# Patient Record
Sex: MALE | Race: WHITE | Employment: FULL TIME | URBAN - METROPOLITAN AREA
[De-identification: names, ages, dates, MRNs, and addresses within clinical notes are randomized per-mention and may not be internally consistent; named-entity substitution may affect disease eponyms.]

---

## 2017-03-10 ENCOUNTER — OFFICE VISIT (OUTPATIENT)
Dept: INTERNAL MEDICINE CLINIC | Facility: CLINIC | Age: 42
End: 2017-03-10

## 2017-03-10 VITALS
TEMPERATURE: 98 F | HEART RATE: 77 BPM | HEIGHT: 70 IN | OXYGEN SATURATION: 99 % | DIASTOLIC BLOOD PRESSURE: 76 MMHG | SYSTOLIC BLOOD PRESSURE: 114 MMHG | WEIGHT: 160.5 LBS | BODY MASS INDEX: 22.98 KG/M2

## 2017-03-10 DIAGNOSIS — L40.8 OTHER PSORIASIS: ICD-10-CM

## 2017-03-10 DIAGNOSIS — N52.9 ERECTILE DYSFUNCTION, UNSPECIFIED ERECTILE DYSFUNCTION TYPE: Primary | ICD-10-CM

## 2017-03-10 DIAGNOSIS — Z00.00 PREVENTATIVE HEALTH CARE: ICD-10-CM

## 2017-03-10 PROCEDURE — 99213 OFFICE O/P EST LOW 20 MIN: CPT | Performed by: INTERNAL MEDICINE

## 2017-03-10 RX ORDER — SILDENAFIL 25 MG/1
25 TABLET, FILM COATED ORAL
Qty: 8 TABLET | Refills: 5 | Status: SHIPPED | OUTPATIENT
Start: 2017-03-10 | End: 2017-03-24 | Stop reason: DRUGHIGH

## 2017-03-10 NOTE — PROGRESS NOTES
Fredrich Gowers is a 39year old male. HPI:   Patient presents with:  New Patient  Other: Personal concern. Patient is here to establish care with our clinic; he was seen at another [facility] clinic last year.   He presents with complaint of erectile dy lb    EXAM:   /76 mmHg  Pulse 77  Temp(Src) 97.7 °F (36.5 °C) (Oral)  Ht 70\"  Wt 160 lb 8 oz  BMI 23.03 kg/m2  SpO2 99%  GENERAL: Alert and oriented, well developed, well nourished,in no apparent distress  HEENT: atraumatic, PERRLA, EOMI, normal lid

## 2017-03-10 NOTE — PATIENT INSTRUCTIONS
We will check your comprehensive blood tests. Come back when you are fasting for the blood work. I have sent in a prescription for Viagra. It was a pleasure seeing you in the clinic today.   Thank you for choosing the [facility]

## 2017-03-13 ENCOUNTER — LAB ENCOUNTER (OUTPATIENT)
Dept: LAB | Age: 42
End: 2017-03-13
Attending: INTERNAL MEDICINE
Payer: COMMERCIAL

## 2017-03-13 ENCOUNTER — TELEPHONE (OUTPATIENT)
Dept: INTERNAL MEDICINE CLINIC | Facility: CLINIC | Age: 42
End: 2017-03-13

## 2017-03-13 DIAGNOSIS — Z00.00 ENCOUNTER FOR PREVENTIVE HEALTH EXAMINATION: ICD-10-CM

## 2017-03-13 DIAGNOSIS — N52.9 ERECTILE DYSFUNCTION, UNSPECIFIED ERECTILE DYSFUNCTION TYPE: ICD-10-CM

## 2017-03-13 DIAGNOSIS — Z00.00 PREVENTATIVE HEALTH CARE: ICD-10-CM

## 2017-03-13 DIAGNOSIS — Z71.1 CONCERN ABOUT STD IN MALE WITHOUT DIAGNOSIS: ICD-10-CM

## 2017-03-13 DIAGNOSIS — Z00.00 ENCOUNTER FOR PREVENTIVE HEALTH EXAMINATION: Primary | ICD-10-CM

## 2017-03-13 LAB
25-HYDROXYVITAMIN D (TOTAL): 38.1 NG/ML (ref 30–100)
ALBUMIN SERPL-MCNC: 3.9 G/DL (ref 3.5–4.8)
ALP LIVER SERPL-CCNC: 55 U/L (ref 45–117)
ALT SERPL-CCNC: 20 U/L (ref 17–63)
AST SERPL-CCNC: 15 U/L (ref 15–41)
BASOPHILS # BLD AUTO: 0.04 X10(3) UL (ref 0–0.1)
BASOPHILS NFR BLD AUTO: 0.7 %
BILIRUB SERPL-MCNC: 0.6 MG/DL (ref 0.1–2)
BUN BLD-MCNC: 20 MG/DL (ref 8–20)
CALCIUM BLD-MCNC: 9.2 MG/DL (ref 8.3–10.3)
CHLORIDE: 110 MMOL/L (ref 101–111)
CHOLEST SMN-MCNC: 160 MG/DL (ref ?–200)
CO2: 31 MMOL/L (ref 22–32)
CREAT BLD-MCNC: 1.25 MG/DL (ref 0.7–1.3)
EOSINOPHIL # BLD AUTO: 0.29 X10(3) UL (ref 0–0.3)
EOSINOPHIL NFR BLD AUTO: 4.8 %
ERYTHROCYTE [DISTWIDTH] IN BLOOD BY AUTOMATED COUNT: 12.5 % (ref 11.5–16)
EST. AVERAGE GLUCOSE BLD GHB EST-MCNC: 105 MG/DL (ref 68–126)
GLUCOSE BLD-MCNC: 95 MG/DL (ref 70–99)
HBA1C MFR BLD HPLC: 5.3 % (ref ?–5.7)
HCT VFR BLD AUTO: 46.5 % (ref 37–53)
HDLC SERPL-MCNC: 55 MG/DL (ref 45–?)
HDLC SERPL: 2.91 {RATIO} (ref ?–4.97)
HGB BLD-MCNC: 16 G/DL (ref 13–17)
IMMATURE GRANULOCYTE COUNT: 0.01 X10(3) UL (ref 0–1)
IMMATURE GRANULOCYTE RATIO %: 0.2 %
LDLC SERPL CALC-MCNC: 93 MG/DL (ref ?–130)
LYMPHOCYTES # BLD AUTO: 1.97 X10(3) UL (ref 0.9–4)
LYMPHOCYTES NFR BLD AUTO: 32.8 %
M PROTEIN MFR SERPL ELPH: 7.8 G/DL (ref 6.1–8.3)
MCH RBC QN AUTO: 30 PG (ref 27–33.2)
MCHC RBC AUTO-ENTMCNC: 34.4 G/DL (ref 31–37)
MCV RBC AUTO: 87.2 FL (ref 80–99)
MONOCYTES # BLD AUTO: 0.62 X10(3) UL (ref 0.1–0.6)
MONOCYTES NFR BLD AUTO: 10.3 %
NEUTROPHIL ABS PRELIM: 3.07 X10 (3) UL (ref 1.3–6.7)
NEUTROPHILS # BLD AUTO: 3.07 X10(3) UL (ref 1.3–6.7)
NEUTROPHILS NFR BLD AUTO: 51.2 %
NONHDLC SERPL-MCNC: 105 MG/DL (ref ?–130)
PLATELET # BLD AUTO: 229 10(3)UL (ref 150–450)
POTASSIUM SERPL-SCNC: 4.7 MMOL/L (ref 3.6–5.1)
RBC # BLD AUTO: 5.33 X10(6)UL (ref 4.3–5.7)
RED CELL DISTRIBUTION WIDTH-SD: 39.7 FL (ref 35.1–46.3)
SODIUM SERPL-SCNC: 144 MMOL/L (ref 136–144)
T PALLIDUM AB SER QL IA: NONREACTIVE
TRIGLYCERIDES: 61 MG/DL (ref ?–150)
TSI SER-ACNC: 1.8 MIU/ML (ref 0.35–5.5)
VLDL: 12 MG/DL (ref 5–40)
WBC # BLD AUTO: 6 X10(3) UL (ref 4–13)

## 2017-03-13 PROCEDURE — 36415 COLL VENOUS BLD VENIPUNCTURE: CPT

## 2017-03-13 PROCEDURE — 80061 LIPID PANEL: CPT

## 2017-03-13 PROCEDURE — 84443 ASSAY THYROID STIM HORMONE: CPT

## 2017-03-13 PROCEDURE — 85025 COMPLETE CBC W/AUTO DIFF WBC: CPT

## 2017-03-13 PROCEDURE — 86780 TREPONEMA PALLIDUM: CPT

## 2017-03-13 PROCEDURE — 80053 COMPREHEN METABOLIC PANEL: CPT

## 2017-03-13 PROCEDURE — 82306 VITAMIN D 25 HYDROXY: CPT

## 2017-03-13 PROCEDURE — 83036 HEMOGLOBIN GLYCOSYLATED A1C: CPT

## 2017-03-13 PROCEDURE — 87389 HIV-1 AG W/HIV-1&-2 AB AG IA: CPT

## 2017-03-13 NOTE — TELEPHONE ENCOUNTER
I can add HIV and syphilis to the blood work he just did, but he will have to come back again to provide a urine sample for gonorrhea and chlamydia tests.

## 2017-03-13 NOTE — TELEPHONE ENCOUNTER
Patient called and asked for STD order for labs be added by doctor for him; he plans to do bloodwork this morning

## 2017-03-24 ENCOUNTER — OFFICE VISIT (OUTPATIENT)
Dept: INTERNAL MEDICINE CLINIC | Facility: CLINIC | Age: 42
End: 2017-03-24

## 2017-03-24 ENCOUNTER — TELEPHONE (OUTPATIENT)
Dept: INTERNAL MEDICINE CLINIC | Facility: CLINIC | Age: 42
End: 2017-03-24

## 2017-03-24 VITALS
TEMPERATURE: 98 F | BODY MASS INDEX: 23.19 KG/M2 | DIASTOLIC BLOOD PRESSURE: 80 MMHG | OXYGEN SATURATION: 98 % | RESPIRATION RATE: 21 BRPM | SYSTOLIC BLOOD PRESSURE: 108 MMHG | HEART RATE: 83 BPM | WEIGHT: 162 LBS | HEIGHT: 70 IN

## 2017-03-24 DIAGNOSIS — N52.9 ERECTILE DYSFUNCTION, UNSPECIFIED ERECTILE DYSFUNCTION TYPE: Primary | ICD-10-CM

## 2017-03-24 PROCEDURE — 99213 OFFICE O/P EST LOW 20 MIN: CPT | Performed by: INTERNAL MEDICINE

## 2017-03-24 RX ORDER — SILDENAFIL CITRATE 20 MG/1
20 TABLET ORAL
Qty: 30 TABLET | Refills: 3 | Status: SHIPPED | OUTPATIENT
Start: 2017-03-24 | End: 2017-03-24

## 2017-03-24 RX ORDER — SILDENAFIL 100 MG/1
100 TABLET, FILM COATED ORAL AS NEEDED
Qty: 8 TABLET | Refills: 11 | Status: SHIPPED | OUTPATIENT
Start: 2017-03-24 | End: 2017-03-24

## 2017-03-24 RX ORDER — SILDENAFIL CITRATE 20 MG/1
20 TABLET ORAL
Qty: 30 TABLET | Refills: 3 | COMMUNITY
Start: 2017-03-24 | End: 2017-04-26 | Stop reason: ALTCHOICE

## 2017-03-24 RX ORDER — TADALAFIL 20 MG/1
20 TABLET ORAL
Qty: 5 TABLET | Refills: 5 | Status: SHIPPED | OUTPATIENT
Start: 2017-03-24 | End: 2017-03-24

## 2017-03-24 NOTE — TELEPHONE ENCOUNTER
We can try Cialis, but I expect that will be similar cost.  Will send in a Cialis prescription to [facility].  Otherwise would need to see if Urology has any other recommendations.

## 2017-03-24 NOTE — TELEPHONE ENCOUNTER
That's fine. Will send in prescription. Can cancel previous prescription for Viagra 100 mg and Cialis 20 mg.

## 2017-03-24 NOTE — PROGRESS NOTES
Chidi Sadler is a 39year old male. HPI:   Patient presents with:  Erectile Dysfuntion: f/u  Patient presents to discuss erectile dysfunction. He was given samples for 25 mg Viagra, took 12.5 mg first, then 25 mg, with no help with symptoms.    St Resp 21  Ht 70\"  Wt 162 lb  BMI 23.24 kg/m2  SpO2 98%  GENERAL: Alert and oriented, well developed, well nourished,in no apparent distress  HEENT: atraumatic, PERRLA, EOMI, normal lid and conjunctiva  LUNGS: clear to auscultation bilaterally, no wheezing/

## 2017-03-24 NOTE — TELEPHONE ENCOUNTER
Pharmacist called back stating that pt was actually looking to have RX changed to generic viagra 20 mg tablets which is covered at a lower cost to pt. Pt was confused when they contacted our office.      RP, ok to change to sildenafil citrate 20 mg?

## 2017-03-24 NOTE — PATIENT INSTRUCTIONS
For your ED:  - Continue with Viagra as needed  - Follow up with Urology (Janneth Osei)  - We will also check your testosterone levels today. It was a pleasure seeing you in the clinic today.   Thank you for choosing the [facility]

## 2017-03-24 NOTE — TELEPHONE ENCOUNTER
Pt's wife called to inform Viagra will be &60 for 3 tabs only, per insurance coverage. Inquiring if any other alterative for medication. Please advise.

## 2017-03-27 ENCOUNTER — APPOINTMENT (OUTPATIENT)
Dept: LAB | Age: 42
End: 2017-03-27
Attending: INTERNAL MEDICINE
Payer: COMMERCIAL

## 2017-03-27 DIAGNOSIS — N52.9 ERECTILE DYSFUNCTION, UNSPECIFIED ERECTILE DYSFUNCTION TYPE: ICD-10-CM

## 2017-03-27 LAB
FSH: 2.9 MIU/ML (ref 1.4–18.1)
LH: 3.1 MIU/ML (ref 1.5–9.3)

## 2017-03-27 PROCEDURE — 84403 ASSAY OF TOTAL TESTOSTERONE: CPT

## 2017-03-27 PROCEDURE — 83002 ASSAY OF GONADOTROPIN (LH): CPT

## 2017-03-27 PROCEDURE — 83001 ASSAY OF GONADOTROPIN (FSH): CPT

## 2017-03-27 PROCEDURE — 84402 ASSAY OF FREE TESTOSTERONE: CPT

## 2017-03-27 PROCEDURE — 36415 COLL VENOUS BLD VENIPUNCTURE: CPT

## 2017-04-26 ENCOUNTER — OFFICE VISIT (OUTPATIENT)
Dept: INTERNAL MEDICINE CLINIC | Facility: CLINIC | Age: 42
End: 2017-04-26

## 2017-04-26 VITALS
TEMPERATURE: 98 F | BODY MASS INDEX: 23 KG/M2 | DIASTOLIC BLOOD PRESSURE: 70 MMHG | RESPIRATION RATE: 15 BRPM | HEART RATE: 61 BPM | SYSTOLIC BLOOD PRESSURE: 106 MMHG | OXYGEN SATURATION: 99 % | WEIGHT: 163 LBS

## 2017-04-26 DIAGNOSIS — G89.29 CHRONIC BILATERAL LOW BACK PAIN WITHOUT SCIATICA: Primary | ICD-10-CM

## 2017-04-26 DIAGNOSIS — M54.50 CHRONIC BILATERAL LOW BACK PAIN WITHOUT SCIATICA: Primary | ICD-10-CM

## 2017-04-26 DIAGNOSIS — L40.8 OTHER PSORIASIS: ICD-10-CM

## 2017-04-26 PROCEDURE — 99214 OFFICE O/P EST MOD 30 MIN: CPT | Performed by: INTERNAL MEDICINE

## 2017-04-26 RX ORDER — CLOBETASOL PROPIONATE 0.5 MG/G
1 OINTMENT TOPICAL 2 TIMES DAILY
Qty: 30 G | Refills: 1 | Status: SHIPPED | OUTPATIENT
Start: 2017-04-26 | End: 2018-07-13

## 2017-04-26 RX ORDER — CLOBETASOL PROPIONATE 0.5 MG/G
1 OINTMENT TOPICAL 2 TIMES DAILY
Qty: 30 G | Refills: 1 | COMMUNITY
Start: 2017-04-26 | End: 2017-04-26

## 2017-04-26 NOTE — PATIENT INSTRUCTIONS
- Continue with home exercises  - Take diclofenac (anti-inflammatory) every 8 hours as needed for pain  - Follow up with Dr. Flory Maradiaga (Chiropractor). It was a pleasure seeing you in the clinic today.   Thank you for choosing the [facility]

## 2017-04-26 NOTE — PROGRESS NOTES
Nida Oscra is a 39year old male. HPI:   Patient presents with:  Low Back Pain: +2 years, no injury, both sides of lower back, refill on cream   Patient presents for follow up on chronic lower back pain. This has been going on for two years.   Ac reports that he does not drink alcohol or use illicit drugs.     Wt Readings from Last 6 Encounters:  04/26/17 : 163 lb  03/24/17 : 162 lb  03/10/17 : 160 lb 8 oz  03/29/16 : 152 lb  07/31/15 : 159 lb  12/30/14 : 163 lb    EXAM:   /70 mmHg  Pulse 61

## 2017-10-11 ENCOUNTER — IMMUNIZATION (OUTPATIENT)
Dept: INTERNAL MEDICINE CLINIC | Facility: CLINIC | Age: 42
End: 2017-10-11

## 2017-10-11 DIAGNOSIS — Z23 NEED FOR VACCINATION: ICD-10-CM

## 2017-10-11 PROCEDURE — 90686 IIV4 VACC NO PRSV 0.5 ML IM: CPT | Performed by: PHYSICIAN ASSISTANT

## 2017-10-11 PROCEDURE — 90471 IMMUNIZATION ADMIN: CPT | Performed by: PHYSICIAN ASSISTANT

## 2017-11-09 ENCOUNTER — HOSPITAL ENCOUNTER (OUTPATIENT)
Dept: GENERAL RADIOLOGY | Age: 42
Discharge: HOME | End: 2017-11-09
Attending: INTERNAL MEDICINE
Payer: COMMERCIAL

## 2017-11-09 ENCOUNTER — OFFICE VISIT (OUTPATIENT)
Dept: INTERNAL MEDICINE CLINIC | Facility: CLINIC | Age: 42
End: 2017-11-09

## 2017-11-09 VITALS
HEART RATE: 78 BPM | OXYGEN SATURATION: 98 % | WEIGHT: 160 LBS | DIASTOLIC BLOOD PRESSURE: 80 MMHG | SYSTOLIC BLOOD PRESSURE: 122 MMHG | BODY MASS INDEX: 22.9 KG/M2 | HEIGHT: 70 IN | TEMPERATURE: 99 F | RESPIRATION RATE: 13 BRPM

## 2017-11-09 DIAGNOSIS — G89.29 CHRONIC RIGHT-SIDED LOW BACK PAIN WITH RIGHT-SIDED SCIATICA: Primary | ICD-10-CM

## 2017-11-09 DIAGNOSIS — M54.41 CHRONIC RIGHT-SIDED LOW BACK PAIN WITH RIGHT-SIDED SCIATICA: Primary | ICD-10-CM

## 2017-11-09 DIAGNOSIS — M54.41 CHRONIC RIGHT-SIDED LOW BACK PAIN WITH RIGHT-SIDED SCIATICA: ICD-10-CM

## 2017-11-09 DIAGNOSIS — G89.29 CHRONIC RIGHT-SIDED LOW BACK PAIN WITH RIGHT-SIDED SCIATICA: ICD-10-CM

## 2017-11-09 PROCEDURE — 99214 OFFICE O/P EST MOD 30 MIN: CPT | Performed by: INTERNAL MEDICINE

## 2017-11-09 PROCEDURE — 72110 X-RAY EXAM L-2 SPINE 4/>VWS: CPT | Performed by: INTERNAL MEDICINE

## 2017-11-09 NOTE — PATIENT INSTRUCTIONS
- Continue to use diclofenac as needed for back pain  - We will check an x-ray today.   If it is abnormal, we will check an MRI of the spine.  - Follow up with Dr. Tania Vincent (chiropractor) if MRI is not significantly abnormal.    It was a pleasure seeing you i

## 2017-11-09 NOTE — PROGRESS NOTES
Terrie Hough is a 43year old male. HPI:   Patient presents with:  Back Pain  Patient presents to discuss his lower back pain. This is a chronic issue, going on for 4+ years. Constant background pain, moderate severity, aching in quality.   More Patient Position: Sitting, Cuff Size: adult)   Pulse 78   Temp 98.8 °F (37.1 °C) (Oral)   Resp 13   Ht 70\"   Wt 160 lb   SpO2 98%   BMI 22.96 kg/m²   GENERAL: Alert and oriented, well developed, well nourished,in no apparent distress  HEENT: atraumatic, P

## 2017-11-11 PROBLEM — G89.29 CHRONIC RIGHT-SIDED LOW BACK PAIN WITH RIGHT-SIDED SCIATICA: Status: ACTIVE | Noted: 2017-11-11

## 2017-11-11 PROBLEM — M54.41 CHRONIC RIGHT-SIDED LOW BACK PAIN WITH RIGHT-SIDED SCIATICA: Status: ACTIVE | Noted: 2017-11-11

## 2017-11-13 ENCOUNTER — TELEPHONE (OUTPATIENT)
Dept: INTERNAL MEDICINE CLINIC | Facility: CLINIC | Age: 42
End: 2017-11-13

## 2017-11-27 ENCOUNTER — HOSPITAL ENCOUNTER (OUTPATIENT)
Dept: MRI IMAGING | Facility: HOSPITAL | Age: 42
Discharge: HOME | End: 2017-11-27
Attending: INTERNAL MEDICINE
Payer: COMMERCIAL

## 2017-11-27 DIAGNOSIS — M54.41 CHRONIC RIGHT-SIDED LOW BACK PAIN WITH RIGHT-SIDED SCIATICA: ICD-10-CM

## 2017-11-27 DIAGNOSIS — G89.29 CHRONIC RIGHT-SIDED LOW BACK PAIN WITH RIGHT-SIDED SCIATICA: ICD-10-CM

## 2017-11-27 PROCEDURE — 72148 MRI LUMBAR SPINE W/O DYE: CPT | Performed by: INTERNAL MEDICINE

## 2017-12-06 ENCOUNTER — OFFICE VISIT (OUTPATIENT)
Dept: SURGERY | Facility: CLINIC | Age: 42
End: 2017-12-06

## 2017-12-06 VITALS
HEIGHT: 70 IN | BODY MASS INDEX: 22.9 KG/M2 | DIASTOLIC BLOOD PRESSURE: 70 MMHG | SYSTOLIC BLOOD PRESSURE: 110 MMHG | WEIGHT: 160 LBS | HEART RATE: 88 BPM

## 2017-12-06 DIAGNOSIS — M54.41 CHRONIC RIGHT-SIDED LOW BACK PAIN WITH RIGHT-SIDED SCIATICA: Primary | ICD-10-CM

## 2017-12-06 DIAGNOSIS — G89.29 CHRONIC RIGHT-SIDED LOW BACK PAIN WITH RIGHT-SIDED SCIATICA: Primary | ICD-10-CM

## 2017-12-06 PROCEDURE — 99204 OFFICE O/P NEW MOD 45 MIN: CPT | Performed by: PHYSICIAN ASSISTANT

## 2017-12-06 NOTE — PATIENT INSTRUCTIONS
Refill policies:    • Allow 2-3 business days for refills; controlled substances may take longer.   • Contact your pharmacy at least 5 days prior to running out of medication and have them send an electronic request or submit request through the [facility] have a procedure or additional testing performed. [facility]) will contact your insurance carrier to obtain pre-certification or prior authorization.     Unfortunately, DAR has seen an increase in denial of payment even though the p

## 2017-12-06 NOTE — H&P
Neurosurgery Clinic Visit  2017    Mai Amezcua PCP:  Julissa Chiang MD    1975 MRN [id number]       CC: Back Pain    HPI:    Karla Bundy is a very pleasant 43year old male who presents with chronic right sided back pain.   Has been ongoing for in HPI.      Physical Exam:     12/06/17  1449   BP: 110/70   Pulse: 88       General: No Apparent Distress, Well Nourished, Well Developed, Normal Voice, Mood Appropriate, Appears Stated Age  HEENT: No scleral icterus, Good Dentition, No Facial Asymmetry = 45 Minutes; More than 50% spent coordinating care and counseling.     Traci Dias PA-C  [facility]  12/6/2017  3:45 PM

## 2017-12-06 NOTE — PROGRESS NOTES
Location of Pain: lower back worse on the right side pain radiates down into right hip    Date Pain Began: 3-4 yrs worsening in the past 6 months           Work Related:   No        Receiving Work Comp/Disability:   No    Numeric Rating Scale:  Pain at Pre

## 2017-12-08 ENCOUNTER — OFFICE VISIT (OUTPATIENT)
Dept: SURGERY | Facility: CLINIC | Age: 42
End: 2017-12-08

## 2017-12-08 VITALS
HEIGHT: 70 IN | BODY MASS INDEX: 22.9 KG/M2 | HEART RATE: 78 BPM | DIASTOLIC BLOOD PRESSURE: 82 MMHG | WEIGHT: 160 LBS | SYSTOLIC BLOOD PRESSURE: 120 MMHG

## 2017-12-08 DIAGNOSIS — M54.41 CHRONIC RIGHT-SIDED LOW BACK PAIN WITH RIGHT-SIDED SCIATICA: Primary | ICD-10-CM

## 2017-12-08 DIAGNOSIS — M79.18 MYOFASCIAL PAIN ON RIGHT SIDE: Primary | ICD-10-CM

## 2017-12-08 DIAGNOSIS — G89.29 CHRONIC RIGHT-SIDED LOW BACK PAIN WITH RIGHT-SIDED SCIATICA: Primary | ICD-10-CM

## 2017-12-08 PROCEDURE — 99204 OFFICE O/P NEW MOD 45 MIN: CPT | Performed by: ANESTHESIOLOGY

## 2017-12-08 NOTE — PROGRESS NOTES
HPI:    Patient ID: Irasema Brady is a 43year old male. HPI    Review of Systems         Current Outpatient Prescriptions:  DICLOFENAC SODIUM 50 MG Oral Tab EC TAKE 1 TABLET (50 MG TOTAL) BY MOUTH 3 (THREE) TIMES DAILY.  Disp: 45 tablet Rfl: 1   Clob

## 2017-12-08 NOTE — PATIENT INSTRUCTIONS
Refill policies:    • Allow 2-3 business days for refills; controlled substances may take longer.   • Contact your pharmacy at least 5 days prior to running out of medication and have them send an electronic request or submit request through the [facility] have a procedure or additional testing performed. [facility]) will contact your insurance carrier to obtain pre-certification or prior authorization.     Unfortunately, DAR has seen an increase in denial of payment even though the p

## 2017-12-08 NOTE — PROGRESS NOTES
Pain management referral initially entered for Dr. Daniele Vines. He is not in network. Changed referral order to Dr. Jame Garner.

## 2017-12-08 NOTE — H&P
Name: Sal Lee   : 1975   DOS: 2017     Chief complaint: Low back pain    History of present illness:  Sal Lee is a 43year old male complaining of 3 year history of intermittent right low back pain without any radicular sympto patient ambulates with normal gait. HEENT: No gross lesion noted. PEERL. No icterus. Neck and Upper Extremity: Supple. No thyromegaly or lymphadenopathy.     Motor Examination:    (R)   (L)  Deltoid:      5    5  Biceps:       5    5  Triceps:      5    5

## 2018-07-16 RX ORDER — CLOBETASOL PROPIONATE 0.5 MG/G
1 OINTMENT TOPICAL 2 TIMES DAILY
Qty: 30 G | Refills: 1 | Status: SHIPPED | OUTPATIENT
Start: 2018-07-16 | End: 2019-03-19

## 2018-07-16 NOTE — TELEPHONE ENCOUNTER
Refill requested: Clobetasol . 05 Ointment    Failed protocol      Last refill: 4/26/17 30G 1R  Relevant Labs: NA  Last OV / RTC advised: 11/9/17 Dr María Elena Wall   4/26/17:   2. Psoriasis  Chronic. Continue clobetasol cream as needed. Refill ordered.      Appt

## 2018-09-05 ENCOUNTER — TELEPHONE (OUTPATIENT)
Dept: INTERNAL MEDICINE CLINIC | Facility: CLINIC | Age: 43
End: 2018-09-05

## 2018-09-05 NOTE — TELEPHONE ENCOUNTER
Pt Spouse, Abhijit Wiggins, calling in on behalf of Raghu Lozano. Requesting to receive vaccinations for Whooping Cough and Tetanus (for becoming a new parent). Okay to schedule appointment?

## 2018-09-06 ENCOUNTER — NURSE ONLY (OUTPATIENT)
Dept: INTERNAL MEDICINE CLINIC | Facility: CLINIC | Age: 43
End: 2018-09-06
Payer: COMMERCIAL

## 2018-09-06 PROCEDURE — 90715 TDAP VACCINE 7 YRS/> IM: CPT | Performed by: INTERNAL MEDICINE

## 2018-09-06 PROCEDURE — 90471 IMMUNIZATION ADMIN: CPT | Performed by: INTERNAL MEDICINE

## 2019-03-19 RX ORDER — CLOBETASOL PROPIONATE 0.5 MG/G
1 OINTMENT TOPICAL 2 TIMES DAILY
Qty: 30 G | Refills: 1 | Status: SHIPPED | OUTPATIENT
Start: 2019-03-19 | End: 2019-06-26

## 2019-03-19 NOTE — TELEPHONE ENCOUNTER
Clobetasol Propionate 0.05 % External Ointment   Patient calling for refill he has moved please send to updated pharmacy in [facility] - updated in chart

## 2019-06-17 RX ORDER — CLOBETASOL PROPIONATE 0.5 MG/G
1 OINTMENT TOPICAL 2 TIMES DAILY
Qty: 30 G | Refills: 1 | OUTPATIENT
Start: 2019-06-17

## 2019-06-17 NOTE — TELEPHONE ENCOUNTER
Patient is requesting a refill on his prescription for Clobetasol Propionate 0.05 % External Ointment. Needs to be sent to [place] [address] [address] [place] [address], [place]  AT [address], [phone], [phone]. Please advise.

## 2019-06-17 NOTE — TELEPHONE ENCOUNTER
Clobetasol 0.05% filled 3-19-19 30 g with 1 refill     LOV 11-9-17   return to clinic as needed with myself.   No upcoming apt on file   Labs 3-27-17

## 2019-06-26 ENCOUNTER — OFFICE VISIT (OUTPATIENT)
Dept: INTERNAL MEDICINE CLINIC | Facility: CLINIC | Age: 44
End: 2019-06-26
Payer: COMMERCIAL

## 2019-06-26 VITALS
SYSTOLIC BLOOD PRESSURE: 104 MMHG | HEIGHT: 69.25 IN | TEMPERATURE: 99 F | HEART RATE: 80 BPM | DIASTOLIC BLOOD PRESSURE: 70 MMHG | BODY MASS INDEX: 23.14 KG/M2 | WEIGHT: 158 LBS | RESPIRATION RATE: 12 BRPM

## 2019-06-26 DIAGNOSIS — M54.41 CHRONIC RIGHT-SIDED LOW BACK PAIN WITH RIGHT-SIDED SCIATICA: ICD-10-CM

## 2019-06-26 DIAGNOSIS — R21 GENERALIZED MACULOPAPULAR RASH: ICD-10-CM

## 2019-06-26 DIAGNOSIS — G89.29 CHRONIC RIGHT-SIDED LOW BACK PAIN WITH RIGHT-SIDED SCIATICA: ICD-10-CM

## 2019-06-26 DIAGNOSIS — L40.9 PSORIASIS: Primary | ICD-10-CM

## 2019-06-26 DIAGNOSIS — Z00.00 PREVENTATIVE HEALTH CARE: ICD-10-CM

## 2019-06-26 PROCEDURE — 99214 OFFICE O/P EST MOD 30 MIN: CPT | Performed by: INTERNAL MEDICINE

## 2019-06-26 RX ORDER — CLOBETASOL PROPIONATE 0.5 MG/G
1 OINTMENT TOPICAL 2 TIMES DAILY
Qty: 60 G | Refills: 5 | Status: SHIPPED | OUTPATIENT
Start: 2019-06-26 | End: 2020-11-23

## 2019-06-26 RX ORDER — FLUTICASONE PROPIONATE 50 MCG
2 SPRAY, SUSPENSION (ML) NASAL DAILY
Qty: 3 BOTTLE | Refills: 1 | Status: SHIPPED | OUTPATIENT
Start: 2019-06-26 | End: 2021-04-26 | Stop reason: ALTCHOICE

## 2019-06-26 NOTE — PATIENT INSTRUCTIONS
- Psoriasis cream refilled  - Get blood work done now. If you cannot do it today, our lab is open Monday - Friday, 7 AM- 4:15 PM.  We will check your general labs as well as your allergy tests. It was a pleasure seeing you in the clinic today.   Thank savana

## 2019-06-26 NOTE — PROGRESS NOTES
Irasema Brady is a 37year old male. HPI:   Patient presents with:  Psoriasis    Patient presents for follow up on several issues. 1 month ago, he had a productive cough with yellowish phlegm. Still with cough and sinus congestion.   Nose feels bloc or use drugs.   Wt Readings from Last 6 Encounters:  06/26/19 : 158 lb  12/08/17 : 160 lb  12/06/17 : 160 lb  11/09/17 : 160 lb  04/26/17 : 163 lb  03/24/17 : 162 lb    EXAM:   /70 (BP Location: Left arm, Patient Position: Sitting, Cuff Size: adult) Assistant)  IVAN Shelton (Nurse Practitioner)  Marylu Payan MD (Anesthesiology)  The patient indicates understanding of these issues and agrees to the plan.   The patient is asked to return to clinic in 1 year with myself for follow up on chroni

## 2019-07-01 ENCOUNTER — LAB ENCOUNTER (OUTPATIENT)
Dept: LAB | Age: 44
End: 2019-07-01
Attending: INTERNAL MEDICINE
Payer: COMMERCIAL

## 2019-07-01 DIAGNOSIS — R21 GENERALIZED MACULOPAPULAR RASH: ICD-10-CM

## 2019-07-01 DIAGNOSIS — Z00.00 PREVENTATIVE HEALTH CARE: ICD-10-CM

## 2019-07-01 LAB
ALBUMIN SERPL-MCNC: 3.7 G/DL (ref 3.4–5)
ALBUMIN/GLOB SERPL: 1.1 {RATIO} (ref 1–2)
ALP LIVER SERPL-CCNC: 57 U/L (ref 45–117)
ALT SERPL-CCNC: 24 U/L (ref 16–61)
ANION GAP SERPL CALC-SCNC: 5 MMOL/L (ref 0–18)
AST SERPL-CCNC: 22 U/L (ref 15–37)
BASOPHILS # BLD AUTO: 0.05 X10(3) UL (ref 0–0.2)
BASOPHILS NFR BLD AUTO: 1 %
BILIRUB SERPL-MCNC: 0.4 MG/DL (ref 0.1–2)
BUN BLD-MCNC: 22 MG/DL (ref 7–18)
BUN/CREAT SERPL: 20.2 (ref 10–20)
CALCIUM BLD-MCNC: 8.4 MG/DL (ref 8.5–10.1)
CHLORIDE SERPL-SCNC: 108 MMOL/L (ref 98–112)
CHOLEST SMN-MCNC: 124 MG/DL (ref ?–200)
CO2 SERPL-SCNC: 29 MMOL/L (ref 21–32)
COMPLEXED PSA SERPL-MCNC: 0.32 NG/ML (ref ?–4)
CREAT BLD-MCNC: 1.09 MG/DL (ref 0.7–1.3)
DEPRECATED RDW RBC AUTO: 41 FL (ref 35.1–46.3)
EOSINOPHIL # BLD AUTO: 0.35 X10(3) UL (ref 0–0.7)
EOSINOPHIL NFR BLD AUTO: 7 %
ERYTHROCYTE [DISTWIDTH] IN BLOOD BY AUTOMATED COUNT: 12.6 % (ref 11–15)
EST. AVERAGE GLUCOSE BLD GHB EST-MCNC: 117 MG/DL (ref 68–126)
GLOBULIN PLAS-MCNC: 3.4 G/DL (ref 2.8–4.4)
GLUCOSE BLD-MCNC: 93 MG/DL (ref 70–99)
HBA1C MFR BLD HPLC: 5.7 % (ref ?–5.7)
HCT VFR BLD AUTO: 41.6 % (ref 39–53)
HDLC SERPL-MCNC: 49 MG/DL (ref 40–59)
HGB BLD-MCNC: 13.9 G/DL (ref 13–17.5)
IMM GRANULOCYTES # BLD AUTO: 0.01 X10(3) UL (ref 0–1)
IMM GRANULOCYTES NFR BLD: 0.2 %
LDLC SERPL CALC-MCNC: 63 MG/DL (ref ?–100)
LYMPHOCYTES # BLD AUTO: 1.64 X10(3) UL (ref 1–4)
LYMPHOCYTES NFR BLD AUTO: 32.6 %
M PROTEIN MFR SERPL ELPH: 7.1 G/DL (ref 6.4–8.2)
MCH RBC QN AUTO: 29.4 PG (ref 26–34)
MCHC RBC AUTO-ENTMCNC: 33.4 G/DL (ref 31–37)
MCV RBC AUTO: 87.9 FL (ref 80–100)
MONOCYTES # BLD AUTO: 0.63 X10(3) UL (ref 0.1–1)
MONOCYTES NFR BLD AUTO: 12.5 %
NEUTROPHILS # BLD AUTO: 2.35 X10 (3) UL (ref 1.5–7.7)
NEUTROPHILS # BLD AUTO: 2.35 X10(3) UL (ref 1.5–7.7)
NEUTROPHILS NFR BLD AUTO: 46.7 %
NONHDLC SERPL-MCNC: 75 MG/DL (ref ?–130)
OSMOLALITY SERPL CALC.SUM OF ELEC: 297 MOSM/KG (ref 275–295)
PLATELET # BLD AUTO: 218 10(3)UL (ref 150–450)
POTASSIUM SERPL-SCNC: 4.1 MMOL/L (ref 3.5–5.1)
RBC # BLD AUTO: 4.73 X10(6)UL (ref 4.3–5.7)
SODIUM SERPL-SCNC: 142 MMOL/L (ref 136–145)
TRIGL SERPL-MCNC: 59 MG/DL (ref 30–149)
TSI SER-ACNC: 2.22 MIU/ML (ref 0.36–3.74)
VLDLC SERPL CALC-MCNC: 12 MG/DL (ref 0–30)
WBC # BLD AUTO: 5 X10(3) UL (ref 4–11)

## 2019-07-01 PROCEDURE — 80053 COMPREHEN METABOLIC PANEL: CPT

## 2019-07-01 PROCEDURE — 36415 COLL VENOUS BLD VENIPUNCTURE: CPT

## 2019-07-01 PROCEDURE — 86003 ALLG SPEC IGE CRUDE XTRC EA: CPT

## 2019-07-01 PROCEDURE — 85025 COMPLETE CBC W/AUTO DIFF WBC: CPT

## 2019-07-01 PROCEDURE — 83036 HEMOGLOBIN GLYCOSYLATED A1C: CPT

## 2019-07-01 PROCEDURE — 82785 ASSAY OF IGE: CPT

## 2019-07-01 PROCEDURE — 84443 ASSAY THYROID STIM HORMONE: CPT

## 2019-07-01 PROCEDURE — 80061 LIPID PANEL: CPT

## 2019-07-02 LAB
ALLERGEN,  IGE: <0.1 KU/L
ALLERGEN,  SHRIMP IGE: <0.1 KU/L
ALLERGEN,  TREE IGE: <0.1 KU/L
ALLERGEN, A.ALTERNATA(TENUIS): <0.1 KU/L
ALLERGEN, BERMUDA GRASS IGE: <0.1 KU/L
ALLERGEN, BOX ELDER/MAPLE IGE: <0.1 KU/L
ALLERGEN, CAT DANDER IGE: <0.1 KU/L
ALLERGEN, CLAM IGE: <0.1 KU/L
ALLERGEN, CODFISH: <0.1 KU/L
ALLERGEN, CORN IGE: <0.1 KU/L
ALLERGEN, COTTONWOOD IGE: <0.1 KU/L
ALLERGEN, D. FARINAE IGE: <0.1 KU/L
ALLERGEN, D.PTERONYSSINUS IGE: <0.1 KU/L
ALLERGEN, DOG DANDER IGE: <0.1 KU/L
ALLERGEN, EGG WHITE IGE: <0.1 KU/L
ALLERGEN, GERMAN COCKROACH IGE: <0.1 KU/L
ALLERGEN, HORMODENDRUM IGE: <0.1 KU/L
ALLERGEN, MARSH ELDER IGE: <0.1 KU/L
ALLERGEN, MILK (COW) IGE: 0.41 KU/L
ALLERGEN, MILK (COW) IGE: 0.41 KU/L
ALLERGEN, MOUNTAIN CEDAR IGE: <0.1 KU/L
ALLERGEN, MOUSE EPITHE IGE: 0.33 KU/L
ALLERGEN, MUCOR RACEMOSUS IGE: <0.1 KU/L
ALLERGEN, OAK TREE IGE: <0.1 KU/L
ALLERGEN, PEANUT IGE: <0.1 KU/L
ALLERGEN, PEANUT IGE: <0.1 KU/L
ALLERGEN, PECAN TREE IGE: <0.1 KU/L
ALLERGEN, PENICILLIUM NOTATUM: <0.1 KU/L
ALLERGEN, PIGWEED IGE: <0.1 KU/L
ALLERGEN, RUSSIAN THISTLE IGE: <0.1 KU/L
ALLERGEN, SCALLOP IGE: <0.1 KU/L
ALLERGEN, SHORT RAGWEED IGE: <0.1 KU/L
ALLERGEN, SOYBEAN IGE: <0.1 KU/L
ALLERGEN, TIMOTHY GRASS IGE: <0.1 KU/L
ALLERGEN, WALNUT TREE IGE: <0.1 KU/L
ALLERGEN, WALNUT/BLACK WALNUT: <0.1 KU/L
ALLERGEN, WHEAT IGE: <0.1 KU/L
ALLERGEN, WHITE ASH IGE: <0.1 KU/L
ALLERGEN, WHITE MULBERRY IGE: <0.1 KU/L
ALLERGEN,ASPERGILLUS FUMIGATUS: <0.1 KU/L
IMMUNOGLOBULIN E: 20 KU/L
IMMUNOGLOBULIN E: 20 KU/L

## 2020-01-21 ENCOUNTER — OFFICE VISIT (OUTPATIENT)
Dept: INTERNAL MEDICINE CLINIC | Facility: CLINIC | Age: 45
End: 2020-01-21
Payer: COMMERCIAL

## 2020-01-21 VITALS
HEIGHT: 70 IN | TEMPERATURE: 98 F | WEIGHT: 161.25 LBS | OXYGEN SATURATION: 98 % | BODY MASS INDEX: 23.08 KG/M2 | DIASTOLIC BLOOD PRESSURE: 78 MMHG | HEART RATE: 77 BPM | SYSTOLIC BLOOD PRESSURE: 120 MMHG | RESPIRATION RATE: 18 BRPM

## 2020-01-21 DIAGNOSIS — J30.89 NON-SEASONAL ALLERGIC RHINITIS, UNSPECIFIED TRIGGER: Primary | ICD-10-CM

## 2020-01-21 PROCEDURE — 99212 OFFICE O/P EST SF 10 MIN: CPT | Performed by: NURSE PRACTITIONER

## 2020-01-21 NOTE — PATIENT INSTRUCTIONS
Controlling Allergens: In the Home  Even a clean home can be full of allergens. So take a moment to see what you can do to cut down on allergens in each room of your home. · Buy an air purifier with a HEPA filter.  Look online or in [employer] f · Control pests such as cockroaches and mice. Close up areas of the home where pests can enter. Don't leave open food out in the kitchen. Use bait or traps to kill pests. Get professional pest control help if the problem doesn't go away.   · Try to stay jose luis Yes

## 2020-01-21 NOTE — PROGRESS NOTES
CHIEF COMPLAINT:   Patient presents with:  Cough: coughing and phlem for the past 3 months      HPI:   Phylicia Hartley is a 40year old male who presents for upper respiratory symptoms for  7 months.  Patient reports nasal congestion, PND, productive yello Patient Position: Sitting, Cuff Size: adult)   Pulse 77   Temp 98 °F (36.7 °C) (Oral)   Resp 18   Ht 70\"   Wt 161 lb 4 oz (73.1 kg)   SpO2 98%   BMI 23.14 kg/m²   GENERAL: well developed, well nourished,in no apparent distress  SKIN: no rashes,no suspicio

## 2020-04-13 ENCOUNTER — VIRTUAL PHONE E/M (OUTPATIENT)
Dept: INTERNAL MEDICINE CLINIC | Facility: CLINIC | Age: 45
End: 2020-04-13
Payer: COMMERCIAL

## 2020-04-13 ENCOUNTER — TELEPHONE (OUTPATIENT)
Dept: INTERNAL MEDICINE CLINIC | Facility: CLINIC | Age: 45
End: 2020-04-13

## 2020-04-13 DIAGNOSIS — M54.50 ACUTE RIGHT-SIDED LOW BACK PAIN WITHOUT SCIATICA: Primary | ICD-10-CM

## 2020-04-13 DIAGNOSIS — G89.29 CHRONIC RIGHT-SIDED LOW BACK PAIN WITHOUT SCIATICA: Primary | ICD-10-CM

## 2020-04-13 DIAGNOSIS — M54.50 CHRONIC RIGHT-SIDED LOW BACK PAIN WITHOUT SCIATICA: Primary | ICD-10-CM

## 2020-04-13 PROCEDURE — 99214 OFFICE O/P EST MOD 30 MIN: CPT | Performed by: INTERNAL MEDICINE

## 2020-04-13 NOTE — TELEPHONE ENCOUNTER
Called patient, got voicemail, will try again in a few minutes. Called again, got voicemail again. Called again, telephone visit completed. See separate documentation.

## 2020-04-13 NOTE — PROGRESS NOTES
Virtual Telephone Check-In    Irasema Brady verbally consents to a Virtual/Telephone Check-In service on 04/13/20. Patient understands and accepts financial responsibility for any deductible, co-insurance and/or co-pays associated with this service. Félix Landin MD

## 2020-04-13 NOTE — TELEPHONE ENCOUNTER
Requesting malvin for back pain   - Insurance information confirmed/updated  - Patient informed that they may receive a call from a blocked/unavailable number.  - Patient aware that a charge may apply if call is converted to a telephone visit with the provide

## 2020-11-22 DIAGNOSIS — L40.9 PSORIASIS: ICD-10-CM

## 2020-11-23 RX ORDER — CLOBETASOL PROPIONATE 0.5 MG/G
1 OINTMENT TOPICAL 2 TIMES DAILY
Qty: 60 G | Refills: 1 | Status: SHIPPED | OUTPATIENT
Start: 2020-11-23 | End: 2021-04-26 | Stop reason: ALTCHOICE

## 2020-11-23 NOTE — TELEPHONE ENCOUNTER
No protocol     Last refill:  6/26/2019 CLobetasole 0.05% 60G 5R    Last virtual vist 4/13/2020 Dr Driver Bi RTC 6-12 months  No FOV scheduled

## 2021-04-26 ENCOUNTER — HOSPITAL ENCOUNTER (OUTPATIENT)
Dept: GENERAL RADIOLOGY | Age: 46
Discharge: HOME | End: 2021-04-26
Attending: INTERNAL MEDICINE
Payer: COMMERCIAL

## 2021-04-26 ENCOUNTER — OFFICE VISIT (OUTPATIENT)
Dept: INTERNAL MEDICINE CLINIC | Facility: CLINIC | Age: 46
End: 2021-04-26
Payer: COMMERCIAL

## 2021-04-26 VITALS
DIASTOLIC BLOOD PRESSURE: 62 MMHG | WEIGHT: 163 LBS | HEIGHT: 70 IN | TEMPERATURE: 98 F | HEART RATE: 68 BPM | SYSTOLIC BLOOD PRESSURE: 114 MMHG | BODY MASS INDEX: 23.34 KG/M2 | RESPIRATION RATE: 16 BRPM | OXYGEN SATURATION: 98 %

## 2021-04-26 DIAGNOSIS — G89.29 CHRONIC PAIN OF LEFT KNEE: ICD-10-CM

## 2021-04-26 DIAGNOSIS — G89.29 CHRONIC RIGHT-SIDED LOW BACK PAIN WITHOUT SCIATICA: Primary | ICD-10-CM

## 2021-04-26 DIAGNOSIS — L40.9 PSORIASIS: ICD-10-CM

## 2021-04-26 DIAGNOSIS — G89.29 CHRONIC RIGHT-SIDED LOW BACK PAIN WITHOUT SCIATICA: ICD-10-CM

## 2021-04-26 DIAGNOSIS — M25.562 CHRONIC PAIN OF LEFT KNEE: ICD-10-CM

## 2021-04-26 DIAGNOSIS — M54.50 CHRONIC RIGHT-SIDED LOW BACK PAIN WITHOUT SCIATICA: ICD-10-CM

## 2021-04-26 DIAGNOSIS — M54.50 CHRONIC RIGHT-SIDED LOW BACK PAIN WITHOUT SCIATICA: Primary | ICD-10-CM

## 2021-04-26 PROCEDURE — 73560 X-RAY EXAM OF KNEE 1 OR 2: CPT | Performed by: INTERNAL MEDICINE

## 2021-04-26 PROCEDURE — 3008F BODY MASS INDEX DOCD: CPT | Performed by: INTERNAL MEDICINE

## 2021-04-26 PROCEDURE — 72110 X-RAY EXAM L-2 SPINE 4/>VWS: CPT | Performed by: INTERNAL MEDICINE

## 2021-04-26 PROCEDURE — 3074F SYST BP LT 130 MM HG: CPT | Performed by: INTERNAL MEDICINE

## 2021-04-26 PROCEDURE — 99213 OFFICE O/P EST LOW 20 MIN: CPT | Performed by: INTERNAL MEDICINE

## 2021-04-26 PROCEDURE — 3078F DIAST BP <80 MM HG: CPT | Performed by: INTERNAL MEDICINE

## 2021-04-26 NOTE — PROGRESS NOTES
Patient Office Visit    ASSESSMENT AND PLAN:   (M54.5,  G89.29) Chronic right-sided low back pain without sciatica  (primary encounter diagnosis)  Plan: XR LUMBAR SPINE (MIN 2 VIEWS) (CPT=72100)  Note: continue low back exercises.  No pain currently     (M2 Medical History:   Diagnosis Date   • Contact dermatitis and other eczema, due to unspecified cause    • Seborrheic dermatitis, unspecified    • Unspecified thrombosed hemorrhoids    • Unspecified tinnitus 6/29/2012       No past surgical history on file. normal  Skin: psoriatic plaques noted in the lower legs   Psychiatric:normal mood

## 2021-04-27 NOTE — PROGRESS NOTES
Dear RN, please let the patient know   1. X ray of the back shows mild narrowing of discs L5-S1 (seen previously in the MRI from 2017) however the height of the discs are all normal. Arthritis seen as well.  [facility]

## 2021-04-27 NOTE — PROGRESS NOTES
Dear RN, please let the patient know   1. X ray of the knee does not show any fractures however it did show fluid on top of the knee. If he is interested I can refer him to our orthopedic/sports medicine department.  [address] [place] [address]

## 2021-08-16 ENCOUNTER — TELEPHONE (OUTPATIENT)
Dept: INTERNAL MEDICINE CLINIC | Facility: CLINIC | Age: 46
End: 2021-08-16

## 2021-08-16 NOTE — TELEPHONE ENCOUNTER
lmtcb  Last referral for Dr Calvin Medina placed on 4/26/21 for 5 visits by Dr Madison Faulkner. Referral expires on 4/26/22.

## 2021-08-16 NOTE — TELEPHONE ENCOUNTER
Pt wife carolyn (ok per hippa) requesting dermatology referral for pt psoriasis on behalf of pt. Pt had previous referral for dermatology but it has .      Confirmed [phone] as best contact for pt gordy

## 2021-08-23 ENCOUNTER — LAB ENCOUNTER (OUTPATIENT)
Dept: LAB | Age: 46
End: 2021-08-23
Attending: INTERNAL MEDICINE
Payer: COMMERCIAL

## 2021-08-23 ENCOUNTER — OFFICE VISIT (OUTPATIENT)
Dept: INTERNAL MEDICINE CLINIC | Facility: CLINIC | Age: 46
End: 2021-08-23
Payer: COMMERCIAL

## 2021-08-23 VITALS
DIASTOLIC BLOOD PRESSURE: 74 MMHG | SYSTOLIC BLOOD PRESSURE: 100 MMHG | TEMPERATURE: 98 F | HEIGHT: 69.13 IN | OXYGEN SATURATION: 98 % | RESPIRATION RATE: 12 BRPM | WEIGHT: 158.19 LBS | BODY MASS INDEX: 23.16 KG/M2 | HEART RATE: 74 BPM

## 2021-08-23 DIAGNOSIS — Z12.5 SCREENING FOR MALIGNANT NEOPLASM OF PROSTATE: ICD-10-CM

## 2021-08-23 DIAGNOSIS — Z00.00 ENCOUNTER FOR PREVENTATIVE ADULT HEALTH CARE EXAMINATION: Primary | ICD-10-CM

## 2021-08-23 DIAGNOSIS — Z00.00 ENCOUNTER FOR PREVENTATIVE ADULT HEALTH CARE EXAMINATION: ICD-10-CM

## 2021-08-23 DIAGNOSIS — L40.9 PSORIASIS: ICD-10-CM

## 2021-08-23 LAB
ALBUMIN SERPL-MCNC: 4 G/DL (ref 3.4–5)
ALBUMIN/GLOB SERPL: 1.1 {RATIO} (ref 1–2)
ALP LIVER SERPL-CCNC: 51 U/L
ALT SERPL-CCNC: 23 U/L
ANION GAP SERPL CALC-SCNC: 4 MMOL/L (ref 0–18)
AST SERPL-CCNC: 15 U/L (ref 15–37)
BASOPHILS # BLD AUTO: 0.07 X10(3) UL (ref 0–0.2)
BASOPHILS NFR BLD AUTO: 0.8 %
BILIRUB SERPL-MCNC: 0.6 MG/DL (ref 0.1–2)
BUN BLD-MCNC: 14 MG/DL (ref 7–18)
CALCIUM BLD-MCNC: 8.8 MG/DL (ref 8.5–10.1)
CHLORIDE SERPL-SCNC: 107 MMOL/L (ref 98–112)
CHOLEST SMN-MCNC: 148 MG/DL (ref ?–200)
CO2 SERPL-SCNC: 29 MMOL/L (ref 21–32)
COMPLEXED PSA SERPL-MCNC: 0.36 NG/ML (ref ?–4)
CREAT BLD-MCNC: 1.12 MG/DL
EOSINOPHIL # BLD AUTO: 0.21 X10(3) UL (ref 0–0.7)
EOSINOPHIL NFR BLD AUTO: 2.4 %
ERYTHROCYTE [DISTWIDTH] IN BLOOD BY AUTOMATED COUNT: 12.7 %
EST. AVERAGE GLUCOSE BLD GHB EST-MCNC: 117 MG/DL (ref 68–126)
GLOBULIN PLAS-MCNC: 3.7 G/DL (ref 2.8–4.4)
GLUCOSE BLD-MCNC: 82 MG/DL (ref 70–99)
HBA1C MFR BLD HPLC: 5.7 % (ref ?–5.7)
HCT VFR BLD AUTO: 43.4 %
HDLC SERPL-MCNC: 51 MG/DL (ref 40–59)
HGB BLD-MCNC: 14.9 G/DL
IMM GRANULOCYTES # BLD AUTO: 0.02 X10(3) UL (ref 0–1)
IMM GRANULOCYTES NFR BLD: 0.2 %
LDLC SERPL CALC-MCNC: 87 MG/DL (ref ?–100)
LYMPHOCYTES # BLD AUTO: 2.72 X10(3) UL (ref 1–4)
LYMPHOCYTES NFR BLD AUTO: 31.6 %
M PROTEIN MFR SERPL ELPH: 7.7 G/DL (ref 6.4–8.2)
MCH RBC QN AUTO: 29.6 PG (ref 26–34)
MCHC RBC AUTO-ENTMCNC: 34.3 G/DL (ref 31–37)
MCV RBC AUTO: 86.3 FL
MONOCYTES # BLD AUTO: 0.74 X10(3) UL (ref 0.1–1)
MONOCYTES NFR BLD AUTO: 8.6 %
NEUTROPHILS # BLD AUTO: 4.85 X10 (3) UL (ref 1.5–7.7)
NEUTROPHILS # BLD AUTO: 4.85 X10(3) UL (ref 1.5–7.7)
NEUTROPHILS NFR BLD AUTO: 56.4 %
NONHDLC SERPL-MCNC: 97 MG/DL (ref ?–130)
OSMOLALITY SERPL CALC.SUM OF ELEC: 290 MOSM/KG (ref 275–295)
PATIENT FASTING Y/N/NP: YES
PATIENT FASTING Y/N/NP: YES
PLATELET # BLD AUTO: 238 10(3)UL (ref 150–450)
POTASSIUM SERPL-SCNC: 3.9 MMOL/L (ref 3.5–5.1)
RBC # BLD AUTO: 5.03 X10(6)UL
SODIUM SERPL-SCNC: 140 MMOL/L (ref 136–145)
TRIGL SERPL-MCNC: 42 MG/DL (ref 30–149)
TSI SER-ACNC: 1.94 MIU/ML (ref 0.36–3.74)
VLDLC SERPL CALC-MCNC: 7 MG/DL (ref 0–30)
WBC # BLD AUTO: 8.6 X10(3) UL (ref 4–11)

## 2021-08-23 PROCEDURE — 99396 PREV VISIT EST AGE 40-64: CPT | Performed by: INTERNAL MEDICINE

## 2021-08-23 PROCEDURE — 80061 LIPID PANEL: CPT

## 2021-08-23 PROCEDURE — 3074F SYST BP LT 130 MM HG: CPT | Performed by: INTERNAL MEDICINE

## 2021-08-23 PROCEDURE — 36415 COLL VENOUS BLD VENIPUNCTURE: CPT

## 2021-08-23 PROCEDURE — 3078F DIAST BP <80 MM HG: CPT | Performed by: INTERNAL MEDICINE

## 2021-08-23 PROCEDURE — 3008F BODY MASS INDEX DOCD: CPT | Performed by: INTERNAL MEDICINE

## 2021-08-23 PROCEDURE — 85025 COMPLETE CBC W/AUTO DIFF WBC: CPT

## 2021-08-23 PROCEDURE — 83036 HEMOGLOBIN GLYCOSYLATED A1C: CPT

## 2021-08-23 PROCEDURE — 80053 COMPREHEN METABOLIC PANEL: CPT

## 2021-08-23 PROCEDURE — 84443 ASSAY THYROID STIM HORMONE: CPT

## 2021-08-23 RX ORDER — CLOBETASOL PROPIONATE 0.5 MG/G
1 OINTMENT TOPICAL 2 TIMES DAILY
COMMUNITY
Start: 2021-06-30 | End: 2021-08-23

## 2021-08-23 RX ORDER — CLOBETASOL PROPIONATE 0.5 MG/G
1 OINTMENT TOPICAL 2 TIMES DAILY
Qty: 120 G | Refills: 0 | Status: SHIPPED | OUTPATIENT
Start: 2021-08-23

## 2021-08-23 NOTE — PATIENT INSTRUCTIONS
- Get blood work done today or another day when fasting (at least 8 hours, water and medications only).   You can call and schedule a lab appointment ahead of time at [phone] or can walk in.  - Clobetasol refilled   - Follow with a gastroenterologist (

## 2021-08-23 NOTE — PROGRESS NOTES
Paulo Webber is a 55year old male. HPI:   Patient presents with:  CPX    Patient presents for CPX/wellness examination. Diet: Could be better. Exercise: Exercises 5 times a week.   Vision: No corrective lenses, no recent eye exam.  Dental: Last suzy Sitting, Cuff Size: adult)   Pulse 74   Temp 98.3 °F (36.8 °C) (Oral)   Resp 12   Ht 5' 9.13\" (1.756 m)   Wt 158 lb 3.2 oz (71.8 kg)   SpO2 98%   BMI 23.28 kg/m²   GENERAL: Alert and oriented, well developed, well nourished,in no apparent distress  SKIN: Jazz Bustillo (Nurse Practitioner)  Alvin Claudio MD (Anesthesiology)  The patient indicates understanding of these issues and agrees to the plan.   The patient is asked to return to clinic as needed - he will be moving permanently to [place] next we

## 2023-10-30 ENCOUNTER — OFFICE VISIT (OUTPATIENT)
Dept: INTERNAL MEDICINE CLINIC | Facility: CLINIC | Age: 48
End: 2023-10-30

## 2023-10-30 VITALS
BODY MASS INDEX: 22.88 KG/M2 | OXYGEN SATURATION: 99 % | HEART RATE: 73 BPM | RESPIRATION RATE: 16 BRPM | SYSTOLIC BLOOD PRESSURE: 120 MMHG | DIASTOLIC BLOOD PRESSURE: 80 MMHG | HEIGHT: 70 IN | WEIGHT: 159.81 LBS | TEMPERATURE: 99 F

## 2023-10-30 DIAGNOSIS — L40.9 PSORIASIS: Primary | Chronic | ICD-10-CM

## 2023-10-30 DIAGNOSIS — Z00.00 PREVENTATIVE HEALTH CARE: ICD-10-CM

## 2023-10-30 DIAGNOSIS — Z12.11 SCREENING FOR MALIGNANT NEOPLASM OF COLON: ICD-10-CM

## 2023-10-30 DIAGNOSIS — R21 RASH AND NONSPECIFIC SKIN ERUPTION: ICD-10-CM

## 2023-10-30 DIAGNOSIS — G89.29 CHRONIC RIGHT-SIDED LOW BACK PAIN WITH RIGHT-SIDED SCIATICA: ICD-10-CM

## 2023-10-30 DIAGNOSIS — M54.41 CHRONIC RIGHT-SIDED LOW BACK PAIN WITH RIGHT-SIDED SCIATICA: ICD-10-CM

## 2023-10-30 DIAGNOSIS — Z12.5 SCREENING FOR MALIGNANT NEOPLASM OF PROSTATE: ICD-10-CM

## 2023-10-30 PROCEDURE — 3074F SYST BP LT 130 MM HG: CPT | Performed by: INTERNAL MEDICINE

## 2023-10-30 PROCEDURE — 3008F BODY MASS INDEX DOCD: CPT | Performed by: INTERNAL MEDICINE

## 2023-10-30 PROCEDURE — 3079F DIAST BP 80-89 MM HG: CPT | Performed by: INTERNAL MEDICINE

## 2023-10-30 PROCEDURE — 99214 OFFICE O/P EST MOD 30 MIN: CPT | Performed by: INTERNAL MEDICINE

## 2023-10-30 RX ORDER — CLOBETASOL PROPIONATE 0.5 MG/G
1 OINTMENT TOPICAL 2 TIMES DAILY
Qty: 120 G | Refills: 1 | Status: SHIPPED | OUTPATIENT
Start: 2023-10-30

## 2023-10-30 NOTE — PATIENT INSTRUCTIONS
- Clobetasol refilled  - Follow up with Dermatology:  Dr. Dennie Organ [facility]  [address] [facility], [address]  [phone]    - Get blood tests done when fasting (water and medications only for 8 hours). Get done at [facility] labs (see handout or go to [employer] for locations). - Follow up with Dr. Corie Fong for colonoscopy:  [address]  [place], [address]  ([address])  Phone: [phone] [address] [address], [address]  ([address])  Phone: [phone]    It was a pleasure seeing you in the clinic today. Thank you for choosing the [facility] office for your healthcare needs. Please call at [phone] with any questions or concerns.     Diane Tang MD

## 2025-04-18 ENCOUNTER — OFFICE VISIT (OUTPATIENT)
Dept: INTERNAL MEDICINE CLINIC | Facility: CLINIC | Age: 50
End: 2025-04-18
Payer: COMMERCIAL

## 2025-04-18 VITALS
SYSTOLIC BLOOD PRESSURE: 122 MMHG | WEIGHT: 154.19 LBS | RESPIRATION RATE: 16 BRPM | HEIGHT: 69.5 IN | OXYGEN SATURATION: 98 % | BODY MASS INDEX: 22.32 KG/M2 | DIASTOLIC BLOOD PRESSURE: 60 MMHG | TEMPERATURE: 98 F | HEART RATE: 82 BPM

## 2025-04-18 DIAGNOSIS — Z12.5 SCREENING FOR MALIGNANT NEOPLASM OF PROSTATE: ICD-10-CM

## 2025-04-18 DIAGNOSIS — M54.41 CHRONIC RIGHT-SIDED LOW BACK PAIN WITH RIGHT-SIDED SCIATICA: ICD-10-CM

## 2025-04-18 DIAGNOSIS — G89.29 CHRONIC PAIN OF LEFT KNEE: ICD-10-CM

## 2025-04-18 DIAGNOSIS — Z00.00 ENCOUNTER FOR PREVENTATIVE ADULT HEALTH CARE EXAMINATION: Primary | ICD-10-CM

## 2025-04-18 DIAGNOSIS — G89.29 CHRONIC RIGHT-SIDED LOW BACK PAIN WITH RIGHT-SIDED SCIATICA: ICD-10-CM

## 2025-04-18 DIAGNOSIS — Z12.11 SCREENING FOR MALIGNANT NEOPLASM OF COLON: ICD-10-CM

## 2025-04-18 DIAGNOSIS — R09.82 POST-NASAL DRIP: ICD-10-CM

## 2025-04-18 DIAGNOSIS — M25.562 CHRONIC PAIN OF LEFT KNEE: ICD-10-CM

## 2025-04-18 PROCEDURE — 99396 PREV VISIT EST AGE 40-64: CPT | Performed by: INTERNAL MEDICINE

## 2025-04-18 NOTE — PROGRESS NOTES
Martín Barrera is a 49 year old male.   HPI:     Chief Complaint   Patient presents with    CPX     Reviewed Preventative/Wellness form with patient.     Referral     Chiropractor     Patient presents for CPX/wellness examination.  Diet: Trying to watch diet  Exercise: 4-5 times a week  Vision: No corrective lenses  Dental: Up to date    Acute issues:  None reported at this time.    Chronic issues:  Chronic issues with post-nasal drip.  Chronic low back pain, left knee pain - worsening of late.  Feels pain when playing with his son, getting up from seated position.    Past medical, family, surgical and social history were reviewed as listed in the chart, and are unchanged from previous visit.  REVIEW OF SYSTEMS:   GENERAL/ const: no fevers/chills, no unintentional weight loss  SKIN: denies any unusual skin lesions  EYES:no vision problems  HEENT: nasal congestion; denies sinus pain or sinus tenderness  LUNGS: denies shortness of breath   CARDIOVASCULAR: denies chest pain  GI: denies nausea/emesis/ abdominal pain diarrhea constipation  : denies dysuria   MUSCULOSKELETAL: chronic low back and left knee pain  NEURO: denies headaches  PSYCHIATRIC: denies issues  ENDOCRINE: no hot/cold intolerance  ALLERGY: Allergies[1]  PAST HISTORY:   Medications - Current[2]  Medical:  has a past medical history of Contact dermatitis and other eczema, due to unspecified cause, Seborrheic dermatitis, unspecified, Unspecified thrombosed hemorrhoids, and Unspecified tinnitus (6/29/2012).  Surgical:  has no past surgical history on file.  Family: family history includes Diabetes in his father; Hypertension in his father.  Social:  reports that he has never smoked. He has never used smokeless tobacco. He reports that he does not drink alcohol and does not use drugs.  Wt Readings from Last 6 Encounters:   04/18/25 154 lb 3.2 oz (69.9 kg)   10/30/23 159 lb 12.8 oz (72.5 kg)   08/23/21 158 lb 3.2 oz (71.8 kg)   04/26/21 163 lb (73.9 kg)    01/21/20 161 lb 4 oz (73.1 kg)   06/26/19 158 lb (71.7 kg)     EXAM:   /60 (BP Location: Left arm, Patient Position: Sitting, Cuff Size: adult)   Pulse 82   Temp 98.3 °F (36.8 °C) (Temporal)   Resp 16   Ht 5' 9.5\" (1.765 m)   Wt 154 lb 3.2 oz (69.9 kg)   SpO2 98%   BMI 22.44 kg/m²   GENERAL: Alert and oriented, well developed, well nourished,in no apparent distress  SKIN: no rashes,no suspicious lesions  HEENT: atraumatic, PERRLA, EOMI, normal lid and conjunctiva, normal external canals and tympanic membranes bilaterally  NECK: supple, no jvd, no thyromegaly, no palpable/tender cervical lymphadenopathy  LUNGS: clear to auscultation bilaterally, no wheezing/rubs  CARDIO: RRR without murmurs.  No clubbing, cyanosis or edema.  GI: soft non tender nondistended no hepatosplenomegaly, bowel sounds throughout  NEURO: CN II-XII intact, 5/5 strength all extremities  MS: Full ROM  PSYCH: pleasant, appropriate mood and affect  ASSESSMENT AND PLAN:   1.  Encounter for preventative adult health examination  2. Screening for malignant neoplasm of prostate  3. Screening for malignant neoplasm of colon  Age appropriate health guidance and counseling provided.  Screening labs ordered as below.  Body mass index is 22.44 kg/m².  Cologuard form completed.    - CBC With Differential With Platelet; Future  - Comp Metabolic Panel (14); Future  - Lipid Panel; Future  - Hemoglobin A1C; Future  - PSA - Total [[id number]][Q]; Future    4. Chronic right-sided low back pain with right-sided sciatica  5. Chronic pain of left knee  Chronic issues; increasing pain of late. Interested in chiropractic evaluation.  Has seen NSGY, Pain management in the past.  Referral entered for Dr. Perez.  - Chiropractic Referral - External    6. Post-nasal drip  Chronic issue.  Will check allergy testing.  - ALLERGENS, ZONE 8 [81096] [Q]; Future    Patient Care Team:  Melinda Scott MD as PCP - General (Internal Medicine)  Eugenio Lemus MD as  Consulting Physician (NEUROSURGERY)  Josef Stoll MD (Anesthesiology)  The patient indicates understanding of these issues and agrees to the plan.  The patient is asked to return to clinic in 12 months for follow up on chronic issues/CPX, or earlier if acute issues arise.    Melinda Scott MD             [1] No Known Allergies  [2]   Current Outpatient Medications:     clobetasol 0.05 % External Ointment, Apply 1 Application  topically 2 (two) times daily. APPLY TO AFFECTED AREA, Disp: 120 g, Rfl: 1

## 2025-04-18 NOTE — PATIENT INSTRUCTIONS
- Get blood tests done at [facility] when you are fasting (water only for 8 hours)  - Will send in Cologuard order.  They will mail you an at home stool kit.  - Referral entered for chiropractor:  Dr. Maikel Perez  [id number]  [place] [address]  [phone]    It was a pleasure seeing you in the clinic today.  Thank you for choosing the [facility] office for your healthcare needs. Please call at [phone] with any questions or concerns.    Melinda Scott MD

## 2025-04-22 ENCOUNTER — TELEPHONE (OUTPATIENT)
Dept: INTERNAL MEDICINE CLINIC | Facility: CLINIC | Age: 50
End: 2025-04-22

## 2025-04-22 DIAGNOSIS — Z12.11 SCREENING FOR COLON CANCER: ICD-10-CM

## 2025-05-01 LAB
(T11) IGE: <0.1 KU/L
(T8) IGE: <0.1 KU/L
ABSOLUTE BASOPHILS: 48 CELLS/UL (ref 0–200)
ABSOLUTE EOSINOPHILS: 492 CELLS/UL (ref 15–500)
ABSOLUTE LYMPHOCYTES: 1512 CELLS/UL (ref 850–3900)
ABSOLUTE MONOCYTES: 636 CELLS/UL (ref 200–950)
ABSOLUTE NEUTROPHILS: 3312 CELLS/UL (ref 1500–7800)
ALBUMIN/GLOBULIN RATIO: 1.6 (CALC) (ref 1–2.5)
ALBUMIN: 4.2 G/DL (ref 3.6–5.1)
ALKALINE PHOSPHATASE: 54 U/L (ref 36–130)
ALT: 11 U/L (ref 9–46)
ALTERNARIA ALTERNATA (M6) IGE: <0.1 KU/L
ASPERGILLUS FUMIGATUS (M3) IGE: <0.1 KU/L
AST: 17 U/L (ref 10–40)
BASOPHILS: 0.8 %
BERMUDA GRASS (G2) IGE: <0.1 KU/L
BILIRUBIN, TOTAL: 1 MG/DL (ref 0.2–1.2)
BUN: 14 MG/DL (ref 7–25)
CALCIUM: 9 MG/DL (ref 8.6–10.3)
CARBON DIOXIDE: 29 MMOL/L (ref 20–32)
CAT DANDER (E1) IGE: <0.1 KU/L
CHLORIDE: 107 MMOL/L (ref 98–110)
CHOL/HDLC RATIO: 2.7 (CALC)
CHOLESTEROL, TOTAL: 127 MG/DL
CLADOSPORIUM HERBARUM (M2) IGE: <0.1 KU/L
CLASS: 0
COCKROACH (I6) IGE: <0.1 KU/L
COMMON RAGWEED (SHORT)$(W1) IGE: <0.1 KU/L
COTTONWOOD (T14) IGE: <0.1 KU/L
CREATININE: 1.18 MG/DL (ref 0.6–1.29)
DERMATOPHAGOIDES FARINAE (D2) IGE: <0.1 KU/L
DERMATOPHAGOIDES PTERONYSSINUS (D1) IGE: <0.1 KU/L
DOG DANDER (E5) IGE: <0.1 KU/L
EGFR: 76 ML/MIN/1.73M2
EOSINOPHILS: 8.2 %
GLOBULIN: 2.7 G/DL (CALC) (ref 1.9–3.7)
GLUCOSE: 89 MG/DL (ref 65–99)
HDL CHOLESTEROL: 47 MG/DL
HEMATOCRIT: 47.4 % (ref 38.5–50)
HEMOGLOBIN A1C: 5.5 %
HEMOGLOBIN: 15.5 G/DL (ref 13.2–17.1)
HICKORY/PECAN TREE (T22)$IGE: <0.1 KU/L
IMMUNOGLOBULIN E: 25 KU/L
LDL-CHOLESTEROL: 66 MG/DL (CALC)
LYMPHOCYTES: 25.2 %
MAPLE (BOX ELDER) (T1)$IGE: <0.1 KU/L
MCH: 30.2 PG (ref 27–33)
MCHC: 32.7 G/DL (ref 32–36)
MCV: 92.2 FL (ref 80–100)
MONOCYTES: 10.6 %
MOUNTAIN CEDAR (T6) IGE: <0.1 KU/L
MOUSE URINE PROTEINS (E72) IGE: <0.1 KU/L
MPV: 9.7 FL (ref 7.5–12.5)
NEUTROPHILS: 55.2 %
NON-HDL CHOLESTEROL: 80 MG/DL (CALC)
OAK (T7) IGE: <0.1 KU/L
PENICILLIUM NOTATUM (M1) IGE: <0.1 KU/L
PLATELET COUNT: 242 THOUSAND/UL (ref 140–400)
POTASSIUM: 4.1 MMOL/L (ref 3.5–5.3)
PROTEIN, TOTAL: 6.9 G/DL (ref 6.1–8.1)
PSA, TOTAL: 0.37 NG/ML
RDW: 12.7 % (ref 11–15)
RED BLOOD CELL COUNT: 5.14 MILLION/UL (ref 4.2–5.8)
ROUGH MARSH ELDER (W16)$IGE: <0.1 KU/L
ROUGH PIGWEED (W14) IGE: <0.1 KU/L
RUSSIAN THISTLE (W11) IGE: <0.1 KU/L
SODIUM: 141 MMOL/L (ref 135–146)
TIMOTHY GRASS (G6) IGE: <0.1 KU/L
TRIGLYCERIDES: 65 MG/DL
WALNUT TREE (T10) IGE: <0.1 KU/L
WHITE ASH (T15) IGE: <0.1 KU/L
WHITE BLOOD CELL COUNT: 6 THOUSAND/UL (ref 3.8–10.8)
WHITE MULBERRY (T70) IGE: <0.1 KU/L

## 2025-05-02 LAB — AMB EXT COLOGUARD RESULT: NEGATIVE

## 2025-05-09 ENCOUNTER — TELEPHONE (OUTPATIENT)
Dept: INTERNAL MEDICINE CLINIC | Facility: CLINIC | Age: 50
End: 2025-05-09

## 2025-05-09 DIAGNOSIS — J30.9 ALLERGIC RHINITIS, UNSPECIFIED SEASONALITY, UNSPECIFIED TRIGGER: Primary | ICD-10-CM

## 2025-07-14 ENCOUNTER — OFFICE VISIT (OUTPATIENT)
Dept: INTERNAL MEDICINE CLINIC | Facility: CLINIC | Age: 50
End: 2025-07-14
Payer: COMMERCIAL

## 2025-07-14 VITALS
TEMPERATURE: 98 F | DIASTOLIC BLOOD PRESSURE: 67 MMHG | OXYGEN SATURATION: 99 % | SYSTOLIC BLOOD PRESSURE: 114 MMHG | WEIGHT: 152.38 LBS | BODY MASS INDEX: 22 KG/M2 | RESPIRATION RATE: 12 BRPM | HEART RATE: 74 BPM

## 2025-07-14 DIAGNOSIS — L40.9 PSORIASIS: Chronic | ICD-10-CM

## 2025-07-14 DIAGNOSIS — M25.362 INSTABILITY OF LEFT KNEE JOINT: ICD-10-CM

## 2025-07-14 DIAGNOSIS — M25.562 CHRONIC PAIN OF LEFT KNEE: Primary | ICD-10-CM

## 2025-07-14 DIAGNOSIS — G89.29 CHRONIC PAIN OF LEFT KNEE: Primary | ICD-10-CM

## 2025-07-14 PROCEDURE — 99213 OFFICE O/P EST LOW 20 MIN: CPT | Performed by: INTERNAL MEDICINE

## 2025-07-14 RX ORDER — CLOBETASOL PROPIONATE 0.5 MG/G
1 OINTMENT TOPICAL 2 TIMES DAILY
Qty: 120 G | Refills: 2 | Status: SHIPPED | OUTPATIENT
Start: 2025-07-14

## 2025-07-14 NOTE — PROGRESS NOTES
Martín Barrera is a 50 year old male.   HPI:   Patient presents for follow up on several issues.     He has been dealing with chronic left knee pain, for 5+ years.  Worsening of late.  Pain especially when knee is flexed/leg is crossed, or if he is kneeling.  Had x-ray of the knee done in 2021 which showed suprapatellar effusion.  It is hard for him to sit long periods due to discomfort in the left knee. Pain at times when ambulating.  Some rare instability type symptoms.     Other chronic issues:   Psoriasis - continues with PRN clobetasol cream.  Needs a refill.    Past medical, family, surgical and social history were reviewed as listed in the chart, and are unchanged from previous visit.    REVIEW OF SYSTEMS:   GENERAL/ const: no fevers/chills, no unintentional weight loss  SKIN: psoriasis  EYES:no vision problems  HEENT: denies sinus pain or sinus tenderness  LUNGS: denies shortness of breath   CARDIOVASCULAR: denies chest pain  GI: denies nausea/emesis/ abdominal pain diarrhea constipation  : denies dysuria   MUSCULOSKELETAL: chronic left knee pain  NEURO: denies headaches  ENDOCRINE: no hot/cold intolerance  ALLERGY: Allergies[1]  PAST HISTORY:     Medications - Current[2]  Medical:  has a past medical history of Contact dermatitis and other eczema, due to unspecified cause, Seborrheic dermatitis, unspecified, Unspecified thrombosed hemorrhoids, and Unspecified tinnitus (6/29/2012).  Surgical:  has no past surgical history on file.  Family: family history includes Diabetes in his father; Hypertension in his father.  Social:  reports that he has never smoked. He has never used smokeless tobacco. He reports that he does not drink alcohol and does not use drugs.  Wt Readings from Last 6 Encounters:   07/14/25 152 lb 6.4 oz (69.1 kg)   04/18/25 154 lb 3.2 oz (69.9 kg)   10/30/23 159 lb 12.8 oz (72.5 kg)   08/23/21 158 lb 3.2 oz (71.8 kg)   04/26/21 163 lb (73.9 kg)   01/21/20 161 lb 4 oz (73.1 kg)       EXAM:    /67 (BP Location: Left arm, Patient Position: Sitting, Cuff Size: adult)   Pulse 74   Temp 97.6 °F (36.4 °C) (Temporal)   Resp 12   Wt 152 lb 6.4 oz (69.1 kg)   SpO2 99%   BMI 22.18 kg/m²   GENERAL: Alert and oriented, well developed, well nourished,in no apparent distress  HEENT: atraumatic, PERRLA, EOMI, normal lid and conjunctiva  LUNGS: clear to auscultation bilaterally, no wheezing/rubs  CARDIO: RRR without murmurs.  No clubbing, cyanosis or edema.  GI: soft non tender nondistended no hepatosplenomegaly, bowel sounds throughout  NEURO: CN II-XII intact, 5/5 strength all extremities  MS: Full ROM, pain in left knee when crossing leg, tenderness to palpation in patellar area of left knee  PSYCH: pleasant, appropriate mood and affect  ASSESSMENT AND PLAN:   1. Chronic pain of left knee  2. Instability of left knee joint  Patient with chronic left knee pain, going on for more than 5 years.  XR of knee done in 2021 showed suprapatellar effusion.  He continues to have pain especially when bending/flexing knee, kneeling.  Some instability symptoms though these are more rare/occasional.  Will check MRI knee.  Will refer to specialist based on MRI results.  - [facility] MRI KNEE, LEFT (CPT=73721); Future    3. Psoriasis  On PRN clobetasol.  Will continue.  Refill sent to pharmacy.  - clobetasol 0.05 % External Ointment; Apply 1 Application  topically 2 (two) times daily. APPLY TO AFFECTED AREA  Dispense: 120 g; Refill: 2    Patient Care Team:  Melinda Scott MD as PCP - General (Internal Medicine)  Eugenio Lemus MD as Consulting Physician (NEUROSURGERY)  Josef Stoll MD (Anesthesiology)  The patient indicates understanding of these issues and agrees to the plan.  The patient is asked to return to clinic in 6-12 months for follow up on chronic issues, or earlier if acute issues arise.    Melinda Scott MD           [1] No Known Allergies  [2]   Current Outpatient Medications:     clobetasol 0.05 %  External Ointment, Apply 1 Application  topically 2 (two) times daily. APPLY TO AFFECTED AREA, Disp: 120 g, Rfl: 2

## 2025-07-14 NOTE — PATIENT INSTRUCTIONS
- Schedule MRI of left knee with [facility] [address] [place] [address]  Phone: [phone]    - Will decide which type of specialist to refer you to based on MRI results.  Will give you specialist contact information on the MRI Mychart result note once results are back.    - Clobetasol refilled to [facility]    It was a pleasure seeing you in the clinic today.  Thank you for choosing the [facility] office for your healthcare needs. Please call at [phone] with any questions or concerns.    Melinda Scott MD

## 2025-07-15 ENCOUNTER — TELEPHONE (OUTPATIENT)
Dept: INTERNAL MEDICINE CLINIC | Facility: CLINIC | Age: 50
End: 2025-07-15

## 2025-07-15 NOTE — TELEPHONE ENCOUNTER
Initiated prior authorization for Clobetasol Propionate 0.05% Ointment through [facility].  KEY: [id number].  Awaiting prior authorization outcome.

## 2025-07-17 NOTE — TELEPHONE ENCOUNTER
Prior authorization for CLOBETASOL PROPIONATE 0.05% OINTMENT has been DENIED.  Confirmation placed in Dr. Scott's in-box.

## 2025-08-22 DIAGNOSIS — L40.9 PSORIASIS: Chronic | ICD-10-CM

## 2025-08-22 RX ORDER — CLOBETASOL PROPIONATE 0.5 MG/G
1 OINTMENT TOPICAL 2 TIMES DAILY
Qty: 120 G | Refills: 2 | Status: SHIPPED | OUTPATIENT
Start: 2025-08-22

## (undated) NOTE — MR AVS SNAPSHOT
[place]  [address] [address]  [address] [facility] [address] [phone]               Thank you for choosing us for your health care visit with Christal Davis MD.  We are glad to serve you and happy to provide you with this s If you are confident that your benefit plan will not require a referral or authorization, such as [place] Medicaid, please feel free to schedule your appointment immediately.  However, if you are unsure about the requirements for authorization, please wait [email or web address] will allow you to access patient instructions from your recent visit,  view other health information, and more. To sign up or find more information, go to [email or web address] and click on the Sign Up Now link in the [employer] box.      Enter

## (undated) NOTE — MR AVS SNAPSHOT
[place]  [address] [facility] [address]  [address] [facility] [address] [phone]               Thank you for choosing us for your health care visit with Reny Austin MD.  We are glad to serve you and happy to provide you with this s numbers can create reimbursement difficulties for you.     Assoc Dx:  Chronic bilateral low back pain without sciatica [M54.5, G89.29]          Reason for Today's Visit     Low Back Pain           Medical Issues Discussed Today     Chronic bilateral low jenni Enter your [employer] Activation Code exactly as it appears below along with your Zip Code and Date of Birth to complete the sign-up process. If you do not sign up before the expiration date, you must request a new code.     Your unique [employer] Access Code: [email or web address]

## (undated) NOTE — MR AVS SNAPSHOT
[place]  [address]  [address] [facility] [address] [phone]               Thank you for choosing us for your health care visit with Justin Perez MD.  We are glad to serve you and happy to provide you with this s Sign up for [employer], your secure online medical record. [employer] will allow you to access patient instructions from your recent visit,  view other health information, and more. To sign up or find more information, go to [email or web address] and cl